# Patient Record
Sex: MALE | Race: WHITE | NOT HISPANIC OR LATINO | URBAN - NONMETROPOLITAN AREA
[De-identification: names, ages, dates, MRNs, and addresses within clinical notes are randomized per-mention and may not be internally consistent; named-entity substitution may affect disease eponyms.]

---

## 2024-02-17 DIAGNOSIS — I10 PRIMARY HYPERTENSION: Primary | ICD-10-CM

## 2024-02-26 RX ORDER — LISINOPRIL 20 MG/1
20 TABLET ORAL DAILY
Qty: 90 TABLET | Refills: 0 | Status: SHIPPED | OUTPATIENT
Start: 2024-02-26 | End: 2024-03-26 | Stop reason: SDUPTHER

## 2024-03-26 ENCOUNTER — OFFICE VISIT (OUTPATIENT)
Dept: PRIMARY CARE | Facility: CLINIC | Age: 74
End: 2024-03-26
Payer: MEDICARE

## 2024-03-26 VITALS
OXYGEN SATURATION: 97 % | HEART RATE: 58 BPM | BODY MASS INDEX: 24.62 KG/M2 | DIASTOLIC BLOOD PRESSURE: 76 MMHG | SYSTOLIC BLOOD PRESSURE: 146 MMHG | TEMPERATURE: 97.9 F | HEIGHT: 72 IN | WEIGHT: 181.8 LBS

## 2024-03-26 DIAGNOSIS — J01.00 ACUTE NON-RECURRENT MAXILLARY SINUSITIS: Primary | ICD-10-CM

## 2024-03-26 DIAGNOSIS — I10 PRIMARY HYPERTENSION: ICD-10-CM

## 2024-03-26 DIAGNOSIS — Z12.5 SCREENING FOR PROSTATE CANCER: ICD-10-CM

## 2024-03-26 PROCEDURE — 99214 OFFICE O/P EST MOD 30 MIN: CPT | Performed by: FAMILY MEDICINE

## 2024-03-26 PROCEDURE — 3077F SYST BP >= 140 MM HG: CPT | Performed by: FAMILY MEDICINE

## 2024-03-26 PROCEDURE — 1159F MED LIST DOCD IN RCRD: CPT | Performed by: FAMILY MEDICINE

## 2024-03-26 PROCEDURE — 1125F AMNT PAIN NOTED PAIN PRSNT: CPT | Performed by: FAMILY MEDICINE

## 2024-03-26 PROCEDURE — 1124F ACP DISCUSS-NO DSCNMKR DOCD: CPT | Performed by: FAMILY MEDICINE

## 2024-03-26 PROCEDURE — 1160F RVW MEDS BY RX/DR IN RCRD: CPT | Performed by: FAMILY MEDICINE

## 2024-03-26 PROCEDURE — 3078F DIAST BP <80 MM HG: CPT | Performed by: FAMILY MEDICINE

## 2024-03-26 RX ORDER — AZITHROMYCIN 500 MG/1
500 TABLET, FILM COATED ORAL DAILY
Qty: 5 TABLET | Refills: 0 | Status: SHIPPED | OUTPATIENT
Start: 2024-03-26 | End: 2024-03-31

## 2024-03-26 RX ORDER — LISINOPRIL 20 MG/1
20 TABLET ORAL DAILY
Qty: 90 TABLET | Refills: 3 | Status: SHIPPED | OUTPATIENT
Start: 2024-03-26

## 2024-03-26 ASSESSMENT — PATIENT HEALTH QUESTIONNAIRE - PHQ9
SUM OF ALL RESPONSES TO PHQ9 QUESTIONS 1 AND 2: 0
2. FEELING DOWN, DEPRESSED OR HOPELESS: NOT AT ALL
1. LITTLE INTEREST OR PLEASURE IN DOING THINGS: NOT AT ALL

## 2024-03-26 ASSESSMENT — PAIN SCALES - GENERAL: PAINLEVEL: 2

## 2024-03-26 NOTE — PROGRESS NOTES
"Subjective   Patient ID: Rajat Topete is a 73 y.o. male who presents for Hypertension (Refill Lisinopril) and Sinus Problem.    Nasal congestion postnasal drip rhinorrhea maxillary region tenderness and fullness for over 1 week  Chronic hypertension           Review of Systems   Constitutional:  Negative for fever.        Also see HPI   Eyes:  Negative for visual disturbance.   Respiratory:  Negative for shortness of breath.    Cardiovascular:  Negative for chest pain.   Gastrointestinal:  Negative for diarrhea and nausea.   Endocrine: Negative.    Genitourinary:  Negative for difficulty urinating.   Skin:  Negative for rash.   Neurological:  Negative for dizziness.        No focal deficits   Psychiatric/Behavioral:  Negative for suicidal ideas.    All other systems reviewed and are negative.      Objective   /80   Pulse 58   Temp 36.6 °C (97.9 °F)   Ht 1.822 m (5' 11.75\")   Wt 82.5 kg (181 lb 12.8 oz)   SpO2 97%   BMI 24.83 kg/m²     Physical Exam  Vitals and nursing note reviewed.   Constitutional:       Appearance: Normal appearance.   HENT:      Head: Normocephalic and atraumatic.      Comments: Some nasal congestion and rhinorrhea, maxillary tenderness  Eyes:      Extraocular Movements: Extraocular movements intact.      Conjunctiva/sclera: Conjunctivae normal.   Cardiovascular:      Rate and Rhythm: Normal rate and regular rhythm.      Heart sounds: Normal heart sounds.   Pulmonary:      Effort: Pulmonary effort is normal.      Breath sounds: Normal breath sounds.      Comments: Lungs essentially CTA b/l  Abdominal:      General: There is no distension.      Palpations: Abdomen is soft. There is no mass.      Tenderness: There is no abdominal tenderness.   Musculoskeletal:      Right lower leg: No edema.      Left lower leg: No edema.   Skin:     Coloration: Skin is not jaundiced.      Findings: No rash.   Neurological:      General: No focal deficit present.      Mental Status: He is alert and " oriented to person, place, and time.   Psychiatric:         Mood and Affect: Mood normal.         Behavior: Behavior normal.         Thought Content: Thought content normal.         Judgment: Judgment normal.         Assessment/Plan   Diagnoses and all orders for this visit:  Acute non-recurrent maxillary sinusitis  -     azithromycin (Zithromax) 500 mg tablet; Take 1 tablet (500 mg) by mouth once daily for 5 days.  Primary hypertension  -     lisinopril 20 mg tablet; Take 1 tablet (20 mg) by mouth once daily.  -     Comprehensive Metabolic Panel; Future  -     TSH with reflex to Free T4 if abnormal; Future  -     Uric Acid; Future  -     Lipid Panel; Future  -     Albumin , Urine Random; Future  -     CBC and Auto Differential; Future  -     Microscopic Only, Urine; Future  Screening for prostate cancer  -     Prostate Specific Antigen, Screen; Future

## 2024-03-26 NOTE — PATIENT INSTRUCTIONS
PAIN MANAGEMENT IN THE ELDERLY  What is pain? Pain is how your body reacts to injury or illness, even as you get older. Pain is not something that normally happens as you age. What you think is painful may not be painful to someone else. Everybody reacts to pain in different ways. But, pain is whatever you say it is! No matter how mild or strong your pain is, you have the right to be “comfortable”. But you need to tell your caregiver that you have pain, so together you can work on treatment options.   The following are some things that are NOT true about getting older and pain.  I should expect to have pain because it is just a part of getting older.  If I tell my caregiver about my pain, he will think I am complaining. You have the right just as anyone else does to be comfortable. Your caregiver cannot treat your pain if you do not tell him about it.  If I tell my caregiver about my pain he will not listen or take me seriously.  If I take pain medicines, I will feel “drugged up” or “doped up”. There are many new medicines now that lessen pain without making you feel “drugged up” when taking them.  Older people should only take pain medicines if their pain is very bad. You have the right to be comfortable no matter if you have a little or a lot of pain.  Pain medicines are addictive and if I take them I will get “hooked”. An addicted person is someone who takes medicines to “get high”. You cannot get addicted to pain medicines if you are taking them to make your pain go away. A person who has diabetes takes their medicine to keep the diabetes I control. It is the same with your pain. You take pain medicines to keep your pain in control and to live a normal life.  What causes pain? Pain can be caused by many things, such as injury, surgery, or disease. Some pain is caused by pressure on a nerve, such as a cancerous tumor. Other pain is caused when nerves are cut as I an accident or surgery. Old injuries that may not  have bothered you may get re?injured, or cause new injuries. You may not want to move the painful part of your body at all. But, you may have pain because you are not moving this body part. But sometimes there is no clear cause for pain.    What are the different types of pain?  Acute pain is short?lived and usually lasts less than 3 months. Caregivers first work to remove the cause of the pain, such fixing a broken arm or leg. Acute pain can usually be controlled or stopped with pain medicine.  Chronic pain lasts longer than 3 to 6 months. Almost 4 out of 5 persons over 65 regularly take medications for chronic pain, and half of this group has seen more than 3 doctors in the past 5 years. This kind of pain is often more complex. Caregivers may use medicines along with other treatments, like physical and relaxation therapies to help your pain.    What is your pain like? Caregivers want you to talk to them about your pain. This helps them learn what may be causing the pain and how to best treat it. You need to tell your caregivers if you have trouble hearing their questions or seeing things. Caregivers can use special tools and ways to better understand their questions about your pain.    What if I cannot talk? Sometimes you may not be able to speak about your pain. You may have illness or injuries like dementia, brain damage, or a stroke. This makes it very hard for your caregivers and family to know you are in pain. Your family may help caregivers understand your pain by watching for physical signs of pain. This means you may act normal or opposite of how your family thinks you should act even though you are having very bad pain.     The following are some signs that your family can watch for that may tell them you are in pain.  If you are normally loud and noisy, you may get very quiet and withdrawn. You may also stop doing activities you used to do.  If you are very quiet and withdrawn, you may get loud, act  stubborn, and hit people.  You may not eat what you normally do. Or, you may only want to drink or eat soft foods.  Suddenly, you may not do all the activities you used to do.  You may lose control of your bowel or bladder.  You may be very depressed and have a sad face.  If you do not talk at all, you may blink your eyes very fast much of the time. You may also grimace.  If you have been very easy going and happy, you may begin to be very sensitive and cry easily.  You might start to walk or move differently than before. You may suddenly stop walking or start pacing all the time.  You may have your knees drawn up to your chest and rock like a baby.  You may touch, rub, pull or pick at a body part that is hurting.  You may start to pull away from peoples’ touch and protect your arms and legs.  You may suddenly begin to fall when you had no problems before.  You may sleep more than usual.  You may start to whimper or groan quietly.  You may become very confused suddenly when there was no problem before.  Why is pain control important? Pain can affect your appetite, how well you sleep, your energy level and ability to do things. Pain can also affect your mood and relationship with others. If caregivers can help you control your pain, you will suffer less and can even heal faster.  Medicines:  Anti?anxiety medicine: this medicine may be given to help you feel less nervous. It may be given by IV, as a shot/injection or by mouth.  Anti?convulsing: this medicine is given to control seizures. It can also be used to treat kinds of chronic nerve pain and may help control your mood swings. It is given by IV, shot/injection or by mouth.  Muscle relaxers: you may need medicine to help your muscles relax. This medicine can be given by IV, shot/injection or by mouth. Muscle relaxers can make you feel dizzy or weak. Call your caregiver if you need help getting out of bed.  NSAIDS: this medicine may be given to decrease  inflammation, which is redness, pain and swelling. It is very good for chronic bone pain that comes from arthritis or cancer. It is given by mouth or inhaled in your nose.  Pain medicine: this medicine affects the nervous system so you feel less pain. Your caregiver will tell you how much to take and how often. Take the medicine regularly as directed by your  caregiver. Do not wait until the pain is too bad. The medicine may not work as well at controlling the pain if you wait too long to take it. Tell caregivers if the pain does not go away or comes back.  Steroids: this medicine may be given to decrease inflammation. There are many different reasons to take steroids. This medication can help a lot but may also have side effects. Be sure  you understand why you need steroids. Don’t stop taking this medicine without your caregiver’s ok. Stopping on your own can cause problems.  Tell your caregiver all medications that you are taking, including over the counter meds and herbals.    How can pain medicine be given? The following are the many different ways pain medicine can be given depending on the kind of pain you are having.  By mouth - you may be given pills or liquid to swallow or you may be given a pill or liquid to put under your tongue. Some medicine can also be given as a lozenge like a cough drop or even as a special lollipop.  Rectal - medicine in a suppository is put into your rectum.  IV - pain medicine can be given as a shot/injection in an IV, into a muscle, or under the skin.  Transdermal - some medicine can be given as a patch that is placed on your skin. This medicine is released slowly to give pain relief for as long as 72 hours.  How can you take pain medicine safely and make it work best for you?  DO NOT wait until you are in pain to take your medicine if your caregiver has suggested a regular schedule around the clock. If you wait until you feel pain, you will only be “chasing” your pain and not  controlling it.  Some pain medicines can make you breathe less deeply and less often. The medicine may also make you sleepy, dizzy, and unsafe to drive a car or use heavy equipment. For these reasons, it is very important to follow your caregiver’s advice on how to use this medicine.  Some foods, alcohol and other medicines may cause unpleasant side effects when you take pain medicine. Follow your caregiver’s advice about how to prevent these problems.  Pain medicine and NSAIDS can make you constipated. Contact your caregiver if you are experiencing constipation.  Do not stop talking pain medicine suddenly if you have been taking it longer than 2 weeks. Your body may have become used to the medicine. Stopping the medicine all at once may cause unpleasant or dangerous side effects.  With time you may feel that the pain medicine is not working as well as it did before. Call your caregiver if this happens. Together you can find new ways to control the pain.    Other non?drug control methods: there are many pain control techniques that can help you deal with pain even if it does not go away completely. It is important to practice some of the techniques whe you do not have pain if possible. This will help the technique work well during an attack of pain.  Breathing exercises.  Environment: being in a quiet place may make it easier for you to deal with the pain. Avoiding bright lights or loud, noisy places can also help control the pain. Making sure your home is not too hot or too cold may also lessen pain.    Retinoid Dermatitis Normal Treatment: I recommended more frequent application of Cetaphil or CeraVe to the areas of dermatitis.

## 2024-03-29 ENCOUNTER — LAB (OUTPATIENT)
Dept: LAB | Facility: LAB | Age: 74
End: 2024-03-29
Payer: MEDICARE

## 2024-03-29 DIAGNOSIS — I10 PRIMARY HYPERTENSION: ICD-10-CM

## 2024-03-29 DIAGNOSIS — Z12.5 SCREENING FOR PROSTATE CANCER: ICD-10-CM

## 2024-03-29 LAB
ALBUMIN SERPL BCP-MCNC: 4.1 G/DL (ref 3.4–5)
ALP SERPL-CCNC: 54 U/L (ref 33–136)
ALT SERPL W P-5'-P-CCNC: 10 U/L (ref 10–52)
ANION GAP SERPL CALC-SCNC: 11 MMOL/L (ref 10–20)
AST SERPL W P-5'-P-CCNC: 19 U/L (ref 9–39)
BASOPHILS # BLD AUTO: 0.04 X10*3/UL (ref 0–0.1)
BASOPHILS NFR BLD AUTO: 0.7 %
BILIRUB SERPL-MCNC: 1.1 MG/DL (ref 0–1.2)
BUN SERPL-MCNC: 11 MG/DL (ref 6–23)
CALCIUM SERPL-MCNC: 9.4 MG/DL (ref 8.6–10.3)
CHLORIDE SERPL-SCNC: 103 MMOL/L (ref 98–107)
CHOLEST SERPL-MCNC: 189 MG/DL (ref 0–199)
CHOLESTEROL/HDL RATIO: 3.3
CO2 SERPL-SCNC: 28 MMOL/L (ref 21–32)
CREAT SERPL-MCNC: 1.14 MG/DL (ref 0.5–1.3)
CREAT UR-MCNC: 70.1 MG/DL (ref 20–370)
EGFRCR SERPLBLD CKD-EPI 2021: 69 ML/MIN/1.73M*2
EOSINOPHIL # BLD AUTO: 0.25 X10*3/UL (ref 0–0.4)
EOSINOPHIL NFR BLD AUTO: 4.1 %
ERYTHROCYTE [DISTWIDTH] IN BLOOD BY AUTOMATED COUNT: 13.4 % (ref 11.5–14.5)
GLUCOSE SERPL-MCNC: 93 MG/DL (ref 74–99)
HCT VFR BLD AUTO: 42.7 % (ref 41–52)
HDLC SERPL-MCNC: 57.1 MG/DL
HGB BLD-MCNC: 14.2 G/DL (ref 13.5–17.5)
IMM GRANULOCYTES # BLD AUTO: 0.02 X10*3/UL (ref 0–0.5)
IMM GRANULOCYTES NFR BLD AUTO: 0.3 % (ref 0–0.9)
LDLC SERPL CALC-MCNC: 114 MG/DL
LYMPHOCYTES # BLD AUTO: 1.86 X10*3/UL (ref 0.8–3)
LYMPHOCYTES NFR BLD AUTO: 30.3 %
MCH RBC QN AUTO: 30.3 PG (ref 26–34)
MCHC RBC AUTO-ENTMCNC: 33.3 G/DL (ref 32–36)
MCV RBC AUTO: 91 FL (ref 80–100)
MICROALBUMIN UR-MCNC: <7 MG/L
MICROALBUMIN/CREAT UR: NORMAL MG/G{CREAT}
MONOCYTES # BLD AUTO: 0.59 X10*3/UL (ref 0.05–0.8)
MONOCYTES NFR BLD AUTO: 9.6 %
NEUTROPHILS # BLD AUTO: 3.37 X10*3/UL (ref 1.6–5.5)
NEUTROPHILS NFR BLD AUTO: 55 %
NON HDL CHOLESTEROL: 132 MG/DL (ref 0–149)
NRBC BLD-RTO: 0 /100 WBCS (ref 0–0)
PLATELET # BLD AUTO: 191 X10*3/UL (ref 150–450)
POTASSIUM SERPL-SCNC: 4.3 MMOL/L (ref 3.5–5.3)
PROT SERPL-MCNC: 6.7 G/DL (ref 6.4–8.2)
PSA SERPL-MCNC: <0.1 NG/ML
RBC # BLD AUTO: 4.68 X10*6/UL (ref 4.5–5.9)
RBC #/AREA URNS AUTO: NORMAL /HPF
SODIUM SERPL-SCNC: 138 MMOL/L (ref 136–145)
SQUAMOUS #/AREA URNS AUTO: NORMAL /HPF
TRIGL SERPL-MCNC: 89 MG/DL (ref 0–149)
TSH SERPL-ACNC: 1.11 MIU/L (ref 0.44–3.98)
URATE SERPL-MCNC: 6.3 MG/DL (ref 4–7.5)
VLDL: 18 MG/DL (ref 0–40)
WBC # BLD AUTO: 6.1 X10*3/UL (ref 4.4–11.3)
WBC #/AREA URNS AUTO: NORMAL /HPF

## 2024-03-29 PROCEDURE — 82043 UR ALBUMIN QUANTITATIVE: CPT

## 2024-03-29 PROCEDURE — 82570 ASSAY OF URINE CREATININE: CPT

## 2024-03-29 PROCEDURE — 36415 COLL VENOUS BLD VENIPUNCTURE: CPT

## 2024-03-29 PROCEDURE — G0103 PSA SCREENING: HCPCS

## 2024-04-01 ASSESSMENT — ENCOUNTER SYMPTOMS
ENDOCRINE NEGATIVE: 1
NAUSEA: 0
DIZZINESS: 0
DIARRHEA: 0
SHORTNESS OF BREATH: 0
DIFFICULTY URINATING: 0
FEVER: 0

## 2024-04-24 NOTE — RESULT ENCOUNTER NOTE
Elevated LDL--recommend diet lower in saturated fat and regular cardiovascular exercise including walking, etc.  Overall cholesterol ratio is in the recommended range of the other cholesterol numbers are in the normal range  The other labs are essentially normal

## 2025-01-03 ENCOUNTER — OFFICE VISIT (OUTPATIENT)
Dept: PRIMARY CARE | Facility: CLINIC | Age: 75
End: 2025-01-03
Payer: MEDICARE

## 2025-01-03 VITALS
HEART RATE: 52 BPM | BODY MASS INDEX: 23.33 KG/M2 | WEIGHT: 176 LBS | DIASTOLIC BLOOD PRESSURE: 90 MMHG | SYSTOLIC BLOOD PRESSURE: 178 MMHG | HEIGHT: 73 IN | TEMPERATURE: 98.1 F | OXYGEN SATURATION: 99 %

## 2025-01-03 DIAGNOSIS — J40 BRONCHITIS: Primary | ICD-10-CM

## 2025-01-03 PROCEDURE — 1159F MED LIST DOCD IN RCRD: CPT | Performed by: FAMILY MEDICINE

## 2025-01-03 PROCEDURE — 99213 OFFICE O/P EST LOW 20 MIN: CPT | Performed by: FAMILY MEDICINE

## 2025-01-03 PROCEDURE — 3008F BODY MASS INDEX DOCD: CPT | Performed by: FAMILY MEDICINE

## 2025-01-03 PROCEDURE — 1126F AMNT PAIN NOTED NONE PRSNT: CPT | Performed by: FAMILY MEDICINE

## 2025-01-03 RX ORDER — HYDROCODONE BITARTRATE AND HOMATROPINE METHYLBROMIDE ORAL SOLUTION 5; 1.5 MG/5ML; MG/5ML
5 LIQUID ORAL EVERY 6 HOURS PRN
Qty: 100 ML | Refills: 0 | Status: SHIPPED | OUTPATIENT
Start: 2025-01-03 | End: 2025-01-08

## 2025-01-03 RX ORDER — AMOXICILLIN AND CLAVULANATE POTASSIUM 875; 125 MG/1; MG/1
875 TABLET, FILM COATED ORAL 2 TIMES DAILY
COMMUNITY
Start: 2024-12-26 | End: 2025-01-05

## 2025-01-03 ASSESSMENT — ENCOUNTER SYMPTOMS
DIARRHEA: 0
DIZZINESS: 0
SHORTNESS OF BREATH: 0
COUGH: 1
FEVER: 0
ENDOCRINE NEGATIVE: 1
DIFFICULTY URINATING: 0
NAUSEA: 0

## 2025-01-03 ASSESSMENT — PATIENT HEALTH QUESTIONNAIRE - PHQ9
1. LITTLE INTEREST OR PLEASURE IN DOING THINGS: NOT AT ALL
SUM OF ALL RESPONSES TO PHQ9 QUESTIONS 1 AND 2: 0
2. FEELING DOWN, DEPRESSED OR HOPELESS: NOT AT ALL

## 2025-01-03 ASSESSMENT — PAIN SCALES - GENERAL: PAINLEVEL_OUTOF10: 0-NO PAIN

## 2025-01-03 NOTE — PROGRESS NOTES
"Subjective   Patient ID: Rajat Topete is a 74 y.o. male who presents for Follow-up ([facility] clinic walk in cough, congestion,drainage x  8 days ago).    Cough persist after being treated with antibiotics, cough worse at night, mildly productive symptoms for over 1 week         Review of Systems   Constitutional:  Negative for fever.        Also see HPI   HENT:  Positive for congestion.    Eyes:  Negative for visual disturbance.   Respiratory:  Positive for cough. Negative for shortness of breath.    Cardiovascular:  Negative for chest pain.   Gastrointestinal:  Negative for diarrhea and nausea.   Endocrine: Negative.    Genitourinary:  Negative for difficulty urinating.   Skin:  Negative for rash.   Neurological:  Negative for dizziness.        No focal deficits   Psychiatric/Behavioral:  Negative for suicidal ideas.    All other systems reviewed and are negative.      Objective   /90   Pulse 52   Temp 36.7 °C (98.1 °F)   Ht 1.854 m (6' 1\")   Wt 79.8 kg (176 lb)   SpO2 99%   BMI 23.22 kg/m²     Physical Exam  Vitals and nursing note reviewed.   Constitutional:       Appearance: Normal appearance.   HENT:      Head: Normocephalic and atraumatic.   Eyes:      Conjunctiva/sclera: Conjunctivae normal.   Cardiovascular:      Rate and Rhythm: Normal rate and regular rhythm.      Heart sounds: Normal heart sounds.   Pulmonary:      Effort: Pulmonary effort is normal.      Breath sounds: Normal breath sounds.   Neurological:      Mental Status: He is oriented to person, place, and time.   Psychiatric:         Mood and Affect: Mood normal.         Behavior: Behavior normal.         Assessment/Plan   Diagnoses and all orders for this visit:  Bronchitis  -     hydrocodone-homatropine (Hycodan) 5-1.5 mg/5 mL syrup; Take 5 mL by mouth every 6 hours if needed for cough for up to 5 days.         "

## 2025-03-17 ENCOUNTER — OFFICE VISIT (OUTPATIENT)
Dept: PRIMARY CARE | Facility: CLINIC | Age: 75
End: 2025-03-17
Payer: COMMERCIAL

## 2025-03-17 VITALS
HEIGHT: 73 IN | TEMPERATURE: 98.2 F | DIASTOLIC BLOOD PRESSURE: 82 MMHG | HEART RATE: 67 BPM | WEIGHT: 181 LBS | BODY MASS INDEX: 23.99 KG/M2 | SYSTOLIC BLOOD PRESSURE: 136 MMHG | OXYGEN SATURATION: 97 %

## 2025-03-17 DIAGNOSIS — I10 PRIMARY HYPERTENSION: Primary | ICD-10-CM

## 2025-03-17 DIAGNOSIS — J40 BRONCHITIS: ICD-10-CM

## 2025-03-17 DIAGNOSIS — Z12.5 SCREENING FOR PROSTATE CANCER: ICD-10-CM

## 2025-03-17 DIAGNOSIS — R73.9 HYPERGLYCEMIA: ICD-10-CM

## 2025-03-17 PROCEDURE — 1159F MED LIST DOCD IN RCRD: CPT | Performed by: FAMILY MEDICINE

## 2025-03-17 PROCEDURE — 1125F AMNT PAIN NOTED PAIN PRSNT: CPT | Performed by: FAMILY MEDICINE

## 2025-03-17 PROCEDURE — 99214 OFFICE O/P EST MOD 30 MIN: CPT | Performed by: FAMILY MEDICINE

## 2025-03-17 PROCEDURE — 3075F SYST BP GE 130 - 139MM HG: CPT | Performed by: FAMILY MEDICINE

## 2025-03-17 PROCEDURE — 1160F RVW MEDS BY RX/DR IN RCRD: CPT | Performed by: FAMILY MEDICINE

## 2025-03-17 PROCEDURE — 3008F BODY MASS INDEX DOCD: CPT | Performed by: FAMILY MEDICINE

## 2025-03-17 PROCEDURE — 3079F DIAST BP 80-89 MM HG: CPT | Performed by: FAMILY MEDICINE

## 2025-03-17 RX ORDER — DOXYCYCLINE 100 MG/1
100 CAPSULE ORAL 2 TIMES DAILY
Qty: 20 CAPSULE | Refills: 0 | Status: SHIPPED | OUTPATIENT
Start: 2025-03-17 | End: 2025-03-27

## 2025-03-17 ASSESSMENT — PATIENT HEALTH QUESTIONNAIRE - PHQ9
2. FEELING DOWN, DEPRESSED OR HOPELESS: NOT AT ALL
1. LITTLE INTEREST OR PLEASURE IN DOING THINGS: NOT AT ALL
SUM OF ALL RESPONSES TO PHQ9 QUESTIONS 1 AND 2: 0

## 2025-03-17 ASSESSMENT — ENCOUNTER SYMPTOMS
DEPRESSION: 0
OCCASIONAL FEELINGS OF UNSTEADINESS: 0
NAUSEA: 0
ENDOCRINE NEGATIVE: 1
SHORTNESS OF BREATH: 0
LOSS OF SENSATION IN FEET: 0
DIARRHEA: 0
SINUS PAIN: 1
SINUS PRESSURE: 1
DIFFICULTY URINATING: 0
DIZZINESS: 0
FEVER: 0
HYPERTENSION: 1

## 2025-03-17 ASSESSMENT — PAIN SCALES - GENERAL: PAINLEVEL_OUTOF10: 5

## 2025-03-17 NOTE — PROGRESS NOTES
"Subjective   Patient ID: Rajat Topete is a 74 y.o. male who presents for Hypertension and Facial Pain.    HPI  The pt presents to the clinic for hypertension and facial pain.    -Hypertension: Fairly controlled. BP was elevated at 140/80 when initially checked in clinic today. BP was normal 136/82 when re-checked later during visit. Taking lisinopril 20 mg daily. Doing well on this med. No side-effects reported.    -Facial pain (sinus infection): Pt endorses congestion, cough, and sinus pressure/pain. Taking Equate cough & cold medication with mild relief of symptoms. Pt received prescription for 10-day course of doxycyline 100 mg BID to treat this condition.    -Health maintenance: Annual labs/tests ordered for pt.    Hypertension  Pertinent negatives include no chest pain or shortness of breath.   Facial Pain  Associated symptoms include congestion. Pertinent negatives include no chest pain, fever, nausea or rash.      Review of Systems   Constitutional:  Negative for fever.        Also see HPI   HENT:  Positive for congestion, postnasal drip, sinus pressure and sinus pain.    Eyes:  Negative for visual disturbance.   Respiratory:  Negative for shortness of breath.    Cardiovascular:  Negative for chest pain.   Gastrointestinal:  Negative for diarrhea and nausea.   Endocrine: Negative.    Genitourinary:  Negative for difficulty urinating.   Skin:  Negative for rash.   Neurological:  Negative for dizziness.        No focal deficits   Psychiatric/Behavioral:  Negative for suicidal ideas.    All other systems reviewed and are negative.      Objective   /82   Pulse 67   Temp 36.8 °C (98.2 °F)   Ht 1.854 m (6' 1\")   Wt 82.1 kg (181 lb)   SpO2 97%   BMI 23.88 kg/m²     Physical Exam  Vitals and nursing note reviewed.   Constitutional:       Appearance: Normal appearance.   HENT:      Head: Normocephalic and atraumatic.      Comments: Mild tenderness to palpitation of maxillary sinuses.     Mouth/Throat:    "   Mouth: Mucous membranes are moist.      Comments: Cobblestoning noted  Airway patent  Eyes:      Conjunctiva/sclera: Conjunctivae normal.   Cardiovascular:      Rate and Rhythm: Normal rate and regular rhythm.      Heart sounds: Normal heart sounds.   Pulmonary:      Effort: Pulmonary effort is normal.      Breath sounds: Normal breath sounds.   Neurological:      Mental Status: He is oriented to person, place, and time.   Psychiatric:         Mood and Affect: Mood normal.         Behavior: Behavior normal.         Assessment/Plan   Diagnoses and all orders for this visit:  Primary hypertension  -     Uric Acid; Future  -     Albumin-Creatinine Ratio, Urine Random; Future  -     CBC and Auto Differential; Future  -     Comprehensive Metabolic Panel; Future  -     Lipid Panel; Future  -     TSH with reflex to Free T4 if abnormal; Future  -     Follow Up In Primary Care - Established; Future  Bronchitis  -     doxycycline (Vibramycin) 100 mg capsule; Take 1 capsule (100 mg) by mouth 2 times a day for 10 days. Take with at least 8 ounces (large glass) of water, do not lie down for 30 minutes after  Screening for prostate cancer  -     Prostate Specific Antigen, Screen; Future  Hyperglycemia  -     Hemoglobin A1C; Future         Scribe Attestation  By signing my name below, IAdelina Scribe   attest that this documentation has been prepared under the direction and in the presence of Ilya Duran DO.

## 2025-04-09 LAB
ALBUMIN SERPL-MCNC: 4 G/DL (ref 3.6–5.1)
ALBUMIN/CREAT UR: 4 MG/G CREAT
ALP SERPL-CCNC: 51 U/L (ref 35–144)
ALT SERPL-CCNC: 7 U/L (ref 9–46)
ANION GAP SERPL CALCULATED.4IONS-SCNC: 8 MMOL/L (CALC) (ref 7–17)
AST SERPL-CCNC: 15 U/L (ref 10–35)
BASOPHILS # BLD AUTO: 43 CELLS/UL (ref 0–200)
BASOPHILS NFR BLD AUTO: 0.7 %
BILIRUB SERPL-MCNC: 0.9 MG/DL (ref 0.2–1.2)
BUN SERPL-MCNC: 12 MG/DL (ref 7–25)
CALCIUM SERPL-MCNC: 9.2 MG/DL (ref 8.6–10.3)
CHLORIDE SERPL-SCNC: 105 MMOL/L (ref 98–110)
CHOLEST SERPL-MCNC: 169 MG/DL
CHOLEST/HDLC SERPL: 3 (CALC)
CO2 SERPL-SCNC: 28 MMOL/L (ref 20–32)
CREAT SERPL-MCNC: 1.2 MG/DL (ref 0.7–1.28)
CREAT UR-MCNC: 110 MG/DL (ref 20–320)
EGFRCR SERPLBLD CKD-EPI 2021: 64 ML/MIN/1.73M2
EOSINOPHIL # BLD AUTO: 311 CELLS/UL (ref 15–500)
EOSINOPHIL NFR BLD AUTO: 5.1 %
ERYTHROCYTE [DISTWIDTH] IN BLOOD BY AUTOMATED COUNT: 13 % (ref 11–15)
EST. AVERAGE GLUCOSE BLD GHB EST-MCNC: 108 MG/DL
EST. AVERAGE GLUCOSE BLD GHB EST-SCNC: 6 MMOL/L
GLUCOSE SERPL-MCNC: 90 MG/DL (ref 65–99)
HBA1C MFR BLD: 5.4 % OF TOTAL HGB
HCT VFR BLD AUTO: 39.4 % (ref 38.5–50)
HDLC SERPL-MCNC: 56 MG/DL
HGB BLD-MCNC: 13.1 G/DL (ref 13.2–17.1)
LDLC SERPL CALC-MCNC: 93 MG/DL (CALC)
LYMPHOCYTES # BLD AUTO: 1556 CELLS/UL (ref 850–3900)
LYMPHOCYTES NFR BLD AUTO: 25.5 %
MCH RBC QN AUTO: 30.3 PG (ref 27–33)
MCHC RBC AUTO-ENTMCNC: 33.2 G/DL (ref 32–36)
MCV RBC AUTO: 91 FL (ref 80–100)
MICROALBUMIN UR-MCNC: 0.4 MG/DL
MONOCYTES # BLD AUTO: 421 CELLS/UL (ref 200–950)
MONOCYTES NFR BLD AUTO: 6.9 %
NEUTROPHILS # BLD AUTO: 3770 CELLS/UL (ref 1500–7800)
NEUTROPHILS NFR BLD AUTO: 61.8 %
NONHDLC SERPL-MCNC: 113 MG/DL (CALC)
PLATELET # BLD AUTO: 177 THOUSAND/UL (ref 140–400)
PMV BLD REES-ECKER: 10.4 FL (ref 7.5–12.5)
POTASSIUM SERPL-SCNC: 4.3 MMOL/L (ref 3.5–5.3)
PROT SERPL-MCNC: 6.4 G/DL (ref 6.1–8.1)
PSA SERPL-MCNC: <0.04 NG/ML
RBC # BLD AUTO: 4.33 MILLION/UL (ref 4.2–5.8)
SODIUM SERPL-SCNC: 141 MMOL/L (ref 135–146)
TRIGL SERPL-MCNC: 105 MG/DL
TSH SERPL-ACNC: 1.22 MIU/L (ref 0.4–4.5)
URATE SERPL-MCNC: 6.8 MG/DL (ref 4–8)
WBC # BLD AUTO: 6.1 THOUSAND/UL (ref 3.8–10.8)

## 2025-04-15 ENCOUNTER — OFFICE VISIT (OUTPATIENT)
Dept: URGENT CARE | Age: 75
End: 2025-04-15
Payer: COMMERCIAL

## 2025-04-15 ENCOUNTER — ANCILLARY PROCEDURE (OUTPATIENT)
Dept: URGENT CARE | Age: 75
End: 2025-04-15
Payer: COMMERCIAL

## 2025-04-15 VITALS
BODY MASS INDEX: 23.99 KG/M2 | HEIGHT: 73 IN | HEART RATE: 56 BPM | WEIGHT: 181 LBS | SYSTOLIC BLOOD PRESSURE: 151 MMHG | DIASTOLIC BLOOD PRESSURE: 80 MMHG | TEMPERATURE: 97.8 F | RESPIRATION RATE: 18 BRPM | OXYGEN SATURATION: 96 %

## 2025-04-15 DIAGNOSIS — T14.8XXA BRUISING: ICD-10-CM

## 2025-04-15 DIAGNOSIS — S80.01XA CONTUSION OF RIGHT KNEE AND LOWER LEG, INITIAL ENCOUNTER: Primary | ICD-10-CM

## 2025-04-15 DIAGNOSIS — T14.8XXA HEMATOMA AND CONTUSION: ICD-10-CM

## 2025-04-15 DIAGNOSIS — S80.11XA CONTUSION OF RIGHT KNEE AND LOWER LEG, INITIAL ENCOUNTER: Primary | ICD-10-CM

## 2025-04-15 PROCEDURE — 99213 OFFICE O/P EST LOW 20 MIN: CPT

## 2025-04-15 PROCEDURE — 1159F MED LIST DOCD IN RCRD: CPT

## 2025-04-15 PROCEDURE — 3008F BODY MASS INDEX DOCD: CPT

## 2025-04-15 PROCEDURE — 73564 X-RAY EXAM KNEE 4 OR MORE: CPT | Mod: RIGHT SIDE

## 2025-04-15 PROCEDURE — 1160F RVW MEDS BY RX/DR IN RCRD: CPT

## 2025-04-15 ASSESSMENT — PATIENT HEALTH QUESTIONNAIRE - PHQ9
2. FEELING DOWN, DEPRESSED OR HOPELESS: NOT AT ALL
1. LITTLE INTEREST OR PLEASURE IN DOING THINGS: NOT AT ALL
SUM OF ALL RESPONSES TO PHQ9 QUESTIONS 1 & 2: 0

## 2025-04-15 ASSESSMENT — ENCOUNTER SYMPTOMS
JOINT SWELLING: 1
BACK PAIN: 0
DIZZINESS: 0
WEAKNESS: 0
FATIGUE: 0
FEVER: 0
COUGH: 0
CHILLS: 0
NUMBNESS: 0
ARTHRALGIAS: 1
SHORTNESS OF BREATH: 0

## 2025-04-15 NOTE — PATIENT INSTRUCTIONS
Discharge instructions:    Please follow up with your orthopedics within the next 5 days.    I do not see any acute changes on your x-ray. Rad maybe small avulsion fracture follow up ortho.  You have developed a hematoma right lower extremity over the tibia.  This is likely secondary to contusion injury.  There is associated bruising and swelling likely secondary to injury.    Please continue using compression brace.  Please take Tylenol as needed for pain control and swelling.  Please continue rest, ice, compression and elevation.    Patient educated to be on the look out for symptoms of DVT as well as compartment syndrome.  Compartment syndrome is when the swelling causes compression to vital vessels.  If you develop any numbness, tingling, weakness, severe pain, pulselessness, paresthesias, abnormal sensation, purple coloration please go the emergency room for evaluation.  If you develop any severe pain, severe swelling of the right lower extremity with associated pain in the calf with substantial redness and swelling and concern for potential DVT you must go to the emergency room for evaluation.  If you develop any chest pain, shortness of breath or difficulty breathing to go to the emergency room.  Please follow-up with your primary care physician as above.    It is important to take prescriptions as prescribed and complete all antibiotics.     If your symptoms worsen you are instructed to immediately go to the emergency room for reevaluation and further assessment.    If you develop any chest pain, SOB, or difficulty breathing you are instructed to go to the emergency room for reevaluation.    All discharge instructions will be provided and explained to the patient at discharge.    If you have any questions regarding your treatment plan please call the [facility] urgent care clinic.

## 2025-04-15 NOTE — PROGRESS NOTES
Subjective   Patient ID: Rajat Topete is a 74 y.o. male. They present today with a chief complaint of Lump (PT states Sunday he knelt onto his RIGHT knee and now has a lump. PT states it is black/blue and swollen. ).    History of Present Illness  74-year-old male presenting to the clinic with complaint of right lower extremity injury.  Patient states on Sunday he was fixing his tractor.  Patient states he may have accidentally dropped the deck on his right knee/upper tibia.  Patient denies any pain.  Patient states he has developed some bruising and swelling over the anterior tibia since injury.  There is associated bruising.  There is swelling.  Patient states has been trying to elevate wear a knee brace.  Patient denies any pain.  Patient states some mild pain to palpation but no pain at rest.  No chest pain.  No shortness of breath.  No difficulty breathing.  Patient does have some chronic varicose veins.  Because of the varicose veins he does have some chronic mild edema.  No blood thinners on board.      History provided by:  Patient      Past Medical History  Allergies as of 04/15/2025 - Reviewed 04/15/2025   Allergen Reaction Noted    Influenza virus vaccine whole Other 03/26/2024       (Not in a hospital admission)       Past Medical History:   Diagnosis Date    Hypertension     Personal history of other diseases of the circulatory system 11/07/2014    History of hypertension       Past Surgical History:   Procedure Laterality Date    COLON SURGERY  11/07/2014    Colon Surgery    PROSTATE SURGERY  11/07/2014    Prostate Surgery    TONSILLECTOMY  11/07/2014    Tonsillectomy        reports that he has been smoking cigarettes. He started smoking about 57 years ago. He has a 13.8 pack-year smoking history. He has never used smokeless tobacco. He reports that he does not drink alcohol and does not use drugs.    Review of Systems  Review of Systems   Constitutional:  Negative for chills, fatigue and fever.  "  Respiratory:  Negative for cough and shortness of breath.    Cardiovascular:  Negative for chest pain.   Musculoskeletal:  Positive for arthralgias and joint swelling. Negative for back pain.   Skin:  Negative for rash.   Neurological:  Negative for dizziness, weakness and numbness.                                  Objective    Vitals:    04/15/25 1105   BP: 151/80   Pulse: 56   Resp: 18   Temp: 36.6 °C (97.8 °F)   TempSrc: Oral   SpO2: 96%   Weight: 82.1 kg (181 lb)   Height: 1.854 m (6' 1\")     No LMP for male patient.    Physical Exam  Vitals reviewed.   Constitutional:       General: He is not in acute distress.     Appearance: Normal appearance. He is not ill-appearing or toxic-appearing.   HENT:      Head: Normocephalic and atraumatic.   Cardiovascular:      Rate and Rhythm: Normal rate and regular rhythm.      Pulses: Normal pulses.           Dorsalis pedis pulses are 2+ on the right side.        Posterior tibial pulses are 2+ on the right side.      Heart sounds: Normal heart sounds, S1 normal and S2 normal. Heart sounds not distant. No murmur heard.     No friction rub. No gallop.   Pulmonary:      Effort: Pulmonary effort is normal.      Breath sounds: Normal breath sounds and air entry. No stridor. No decreased breath sounds, wheezing, rhonchi or rales.   Musculoskeletal:      Right ankle: No swelling or ecchymosis. No tenderness. Normal range of motion. Normal pulse.      Left ankle: Normal.      Comments: Right lower extremity evaluation patient does have some swelling over the right knee with associated hematoma of the anterior tibia proximal.  There is associated bruising over the anterior tibia hematoma.  There is no substantial pain to palpation he states mild.  No pain to palpation of the quad, patella, patellar tendon, knee joint.  Patient states only spot of pain is directly over the hematoma.  Right lower extremity with mild edema trace likely secondary to injury.  Patient states he has " chronic varicose veins which are present on examination no pain to palpation of the deep venous system.  No associated redness or swelling of the calf only palpable varicose veins.   Neurological:      Mental Status: He is alert and oriented to person, place, and time.      Comments: Strength 5/5.          Procedures    Point of Care Test & Imaging Results from this visit  No results found for this visit on 04/15/25.   Imaging  No results found.    Cardiology, Vascular, and Other Imaging  No other imaging results found for the past 2 days      Diagnostic study results (if any) were reviewed by [facility].    Assessment/Plan   Allergies, medications, history, and pertinent labs/EKGs/Imaging reviewed by Yomi Avila PA-C.     Medical Decision Makin-year-old male presenting to the clinic with complaint of right lower extremity injury.  Patient states on  he was fixing his tractor.  Patient states he may have accidentally dropped the deck on his right knee/upper tibia.  Patient denies any pain.  Patient states he has developed some bruising and swelling over the anterior tibia since injury.  There is  associated bruising.  There is swelling.  Patient states has been trying to elevate wear a knee brace.  Patient denies any pain.  Patient states some mild pain to palpation but no pain at rest.  No chest pain.  No shortness of breath.  No difficulty breathing.  Patient does have some chronic varicose veins.  Because of the varicose veins he does have some chronic mild edema.  No blood thinners on board. Right lower extremity evaluation patient does have some swelling over the right knee with associated hematoma of the anterior tibia proximal.  There is associated bruising over the anterior tibia hematoma.  There is no substantial pain to palpation he states mild.  No pain to palpation of the quad, patella, patellar tendon, knee joint.  Patient states only spot of pain is directly over the  hematoma.  Right lower extremity with mild edema trace likely secondary to injury.  Patient states he has chronic varicose veins which are present on examination no pain to palpation of the deep venous system.  No associated redness or swelling of the calf only palpable varicose veins.  Vital signs in the clinic are stable within normal limits.  Patient's DVT Wells score criteria is -1.  Unlikely DVT.  Likely hematoma secondary to contusion injury.  Discharge instructions to follow.  Impression of x-ray: Patella jaylen and short linear possible avulsion fracture along the inferior aspect of the patella with anterior infrapatellar soft tissue swelling.  Correlation with site of injury and overlying point tenderness recommended.  There is a potential small avulsion linear fracture.  Patient has 5 out of 5 strength with extension of the knee strong strength.  Attending physician consulted on patient case okay with Ortho follow-up without splinting. Discharge instructions: Please follow up with orthopedics within the next 5 days. Rad maybe small avulsion fracture follow up ortho.  You have developed a hematoma right lower extremity over the tibia.  This is likely secondary to contusion injury.  There is associated bruising and swelling likely secondary to injury. Please continue using compression brace.  Please take Tylenol as needed for pain control and swelling.  Please continue rest, ice, compression and elevation. Patient educated to be on the look out for symptoms of DVT as well as compartment syndrome.  Compartment syndrome is when the swelling causes compression to vital vessels.  If you develop any numbness, tingling, weakness, severe pain, pulselessness, paresthesias, abnormal sensation, purple coloration please go the emergency room for evaluation.  If you develop any severe pain, severe swelling of the right lower extremity with associated pain in the calf with substantial redness and swelling and concern for  potential DVT you must go to the emergency room for evaluation.  If you develop any chest pain, shortness of breath or difficulty breathing to go to the emergency room.  It is important to take prescriptions as prescribed and complete all antibiotics. If your symptoms worsen you are instructed to immediately go to the emergency room for reevaluation and further assessment. If you develop any chest pain, SOB, or difficulty breathing you are instructed to go to the emergency room for reevaluation. All discharge instructions will be provided and explained to the patient at discharge. If you have any questions regarding your treatment plan please call the [facility] urgent care clinic.     Orders and Diagnoses  Diagnoses and all orders for this visit:  Bruising  -     XR knee right 4+ views; Future      Medical Admin Record      Patient disposition: Home    Electronically signed by [facility]  11:43 AM

## 2025-05-14 DIAGNOSIS — I10 PRIMARY HYPERTENSION: ICD-10-CM

## 2025-05-23 RX ORDER — LISINOPRIL 20 MG/1
20 TABLET ORAL DAILY
Qty: 90 TABLET | Refills: 3 | Status: SHIPPED | OUTPATIENT
Start: 2025-05-23

## 2025-07-25 DIAGNOSIS — M79.2 NEUROPATHIC PAIN: ICD-10-CM

## 2025-07-25 DIAGNOSIS — M79.671 PAIN IN BOTH FEET: Primary | ICD-10-CM

## 2025-07-25 DIAGNOSIS — M79.672 PAIN IN BOTH FEET: Primary | ICD-10-CM

## 2025-07-25 RX ORDER — GABAPENTIN 300 MG/1
300 CAPSULE ORAL NIGHTLY
Qty: 30 CAPSULE | Refills: 2 | Status: SHIPPED | OUTPATIENT
Start: 2025-07-25

## 2025-07-28 ENCOUNTER — HOSPITAL ENCOUNTER (OUTPATIENT)
Dept: RADIOLOGY | Facility: HOSPITAL | Age: 75
Discharge: HOME | End: 2025-07-28
Payer: COMMERCIAL

## 2025-07-28 DIAGNOSIS — M79.671 PAIN IN BOTH FEET: ICD-10-CM

## 2025-07-28 DIAGNOSIS — M79.672 PAIN IN BOTH FEET: ICD-10-CM

## 2025-07-28 PROCEDURE — 73630 X-RAY EXAM OF FOOT: CPT | Mod: 50

## 2025-07-28 PROCEDURE — 73630 X-RAY EXAM OF FOOT: CPT | Mod: BILATERAL PROCEDURE | Performed by: RADIOLOGY
